# Patient Record
(demographics unavailable — no encounter records)

---

## 2025-01-13 NOTE — REVIEW OF SYSTEMS
[Short Stature] : short stature was noted [Change in Activity] : no change in activity [Rash] : no rash [Skin Lesions] : no skin lesions [Back Pain] : ~T no back pain [Chest Pain] : no chest pain [Shortness of Breath] : no shortness of breath [Change in Appetite] : no change in appetite [Abdominal Pain] : no abdominal pain [Constipation] : no constipation [Sleep Disturbances] : ~T no sleep disturbances [Headache] : no headache

## 2025-01-13 NOTE — HISTORY OF PRESENT ILLNESS
[FreeTextEntry2] : Gavino is a 15 year 2-month-old male here for follow up for growth hormone deficiency on Skytrofa started on 3/1/23.  His current dose is 13.3 mg weekly. Patient takes growth hormone injections on Monday night. Missed one dose in the past 6 month, overall consistent with injections.  Denied headaches, vision changes, leg/hip pains, peripheral swellings.  Reports knee pain vs numbness, attributes to exercise. Gavino exercises in gym, does weights.  He cut out soda but eats a lot of fast food.  No recent illnesses.

## 2025-01-13 NOTE — PHYSICAL EXAM
[Healthy Appearing] : healthy appearing [Normal Appearance] : normal appearance [Well formed] : well formed [Normally Set] : normally set [WNL for age] : within normal limits of age [None] : there were no thyroid nodules [Normal S1 and S2] : normal S1 and S2 [Clear to Ausculation Bilaterally] : clear to auscultation bilaterally [Abdomen Soft] : soft [Abdomen Tenderness] : non-tender [] : no hepatosplenomegaly [Testes] : normal [___] : [unfilled] [Normal] : normal  [Goiter] : no goiter [Murmur] : no murmurs [5] : was [place] stage 5 [Abundant] : abundant

## 2025-01-13 NOTE — ASSESSMENT
[FreeTextEntry1] : 15 year 2-month-old male with growth hormone deficiency, on long-acting growth hormone Skytrofa started 3/1/23. Patient grew ~3cm/6 month (~6 cm/year).   Will f/u pending labs Will consider adjusting growth hormone dose after reviewing blood work results.  Bone age X-ray as prescribed

## 2025-01-13 NOTE — DATA REVIEWED
[FreeTextEntry1] : 1/11/25 HbA1C 5.4%  7/10/24 CBC WNL, free T4  1.3, TSH  1.27, HbA1C 5.7%,  IGF-1  908, IGF- BP3 6.58  I personally reviewed bone age X-ray performed on 3/11/24 at a chronological age 14 years 3 month and felt that it is most consistent with Greulich and Juan Diego standard 14 years. Francisco Pinneau predicted height is ~70 inches.  2/26/24 CBC/CMP WNL, free T4  1.1, TSH not done,  IGF-1  487, IGF-BP3  7.1.   8/14/23 (day #4.5 post Skytrofa injection) CBC/CMP WNL, HbA1C 5.1%, free T4  1.0, TSH  0.31, IGF-1  744(+3.3SD), IGF-BP3  9.9(H).   Pituitary MRI (1/19/23) Equivocal 0.4 cm focal hyperenhancement in the posterior aspect of the pituitary gland, questionable for microadenoma.   I personally reviewed bone age X-ray performed on 11/26/22 at a chronological age 13 years and felt that it is most consistent with Greulich and Juan Diego standard 13 years. Francisco Pinneau predicted height is 66 inches.   10/26/22 CBC/CMP WNL, free T4  1.3, TSH 1.44, IGA  104, TTgIGA<1.2, ESR 1, IGF-1  286, IGF-BP3  4.74.

## 2025-02-03 NOTE — HISTORY OF PRESENT ILLNESS
[FreeTextEntry1] : Pt has a history of executive functioning and learning challenges. He was referred for a neuropsychological evaluation to determine his overall functioning and provide treatment recommendations.

## 2025-02-03 NOTE — REASON FOR VISIT
[Psychology testing session] : Psychology testing session [Patient with collateral] : Patient with collateral  [Father] : father [FreeTextEntry1] : neuropsychological evaluation

## 2025-02-03 NOTE — DISCUSSION/SUMMARY
[FreeTextEntry8] : Pts parent was seen in person for intake for 20 minutes (1:30-1:50pm). Pts parent discussed reason for referral and concerns with Pts functioning, including executive functioning challenges. Limits of confidentiality were discussed.  Pt was accompanied by father and seen for first testing session for 1 hour and 45 minutes (1:50-3:35pm), followed by 20 minutes of scoring. The WISC-V and WIAT 4 were administered. Breaks were taken as needed. Pt. was alert throughout testing. Full report to follow.  Next session scheduled for 02/04/2025. Parent will be contacted to schedule a feedback session upon completion of the report.

## 2025-02-05 NOTE — DISCUSSION/SUMMARY
[FreeTextEntry8] : Pt was seen for second testing session for 2 hours and 16 minutes (1:39-3:55pm), followed by 31 minutes of scoring. The WIAT-4 was administered. Breaks were taken as needed. Patient was alert throughout testing. BRIEF-2 and Dowell rating scales were also distributed to parents. Full report to follow.  Note: Extended testing time for this evaluation was medically necessary due to pt's challenges with executive functioning. He required additional time to process and express information, thereby extending testing that is medically necessary for diagnostic clarity and treatment planning.    [FreeTextEntry4] : Parent will be contacted to schedule a feedback session upon completion of the report.

## 2025-02-05 NOTE — REASON FOR VISIT
[Psychology testing session] : Psychology testing session [Patient] : Patient [FreeTextEntry1] : neuropsychological evaluation

## 2025-03-05 NOTE — DISCUSSION/SUMMARY
[FreeTextEntry8] : Pt's parents were seen for feedback for 45 minutes via telehealth (9:30am-10:15am) (via audio/video teleconferencing). Parent and provider attended session within [facility].  Results and recommendations were reviewed and they were in agreement. They will be provided with a signed copy of the evaluation.   Note: Extended testing time for this evaluation was medically necessary due to pt's challenges with executive functioning. He required additional time to process and express information, thereby extending testing that is medically necessary for diagnostic clarity and treatment planning.   Evaluation is complete; see full report for detailed information about results, conclusions, and recommendations.

## 2025-03-05 NOTE — REASON FOR VISIT
[Feedback of results of psychological evaluation] : Feedback of results of psychological evaluation [Collateral without patient] : Collateral without patient [Mother] : mother [Father] : father [FreeTextEntry1] : neuropsychological evaluation

## 2025-06-27 NOTE — ASSESSMENT
[FreeTextEntry1] : 15 year 7-month-old male with growth hormone deficiency, on long-acting growth hormone Skytrofa started 3/1/23. Patient grew ~3cm/6 month (~6 cm/year). He has mild left thoracolumbar scoliosis.   Repeat lab work as prescribed.  Will consider adjusting Skytrofa dose after reviewing lab work results.  Bone age X-ray to be done prior to next visit.  Recommended f/u with Orthopedist.

## 2025-06-27 NOTE — HISTORY OF PRESENT ILLNESS
[FreeTextEntry2] : Gavino is a 15 year 7-month-old male here for follow up for growth hormone deficiency on Skytrofa started on 3/1/23.  His current dose is 15.2mg weekly. It was increased from 13.3 mg weekly in March.   Injections given by father on Sundays or Monday night. Last injections given on Monday.   Patient denied headaches, vision changes, leg/hip pains, peripheral swellings.   Gavino is going to a hockey sleep away camp in [place].   No recent illnesses.

## 2025-06-27 NOTE — PHYSICAL EXAM
[Healthy Appearing] : healthy appearing [Normal Appearance] : normal appearance [Well formed] : well formed [Normally Set] : normally set [WNL for age] : within normal limits of age [None] : there were no thyroid nodules [Normal S1 and S2] : normal S1 and S2 [Clear to Ausculation Bilaterally] : clear to auscultation bilaterally [Abdomen Soft] : soft [Abdomen Tenderness] : non-tender [] : no hepatosplenomegaly [5] : was [place] stage 5 [Abundant] : abundant [Testes] : normal [___] : [unfilled] [Normal] : normal  [Left Paraspinal Hump] : left paraspinal hump was appreciated [Goiter] : no goiter [Murmur] : no murmurs

## 2025-06-28 NOTE — PHYSICAL EXAM
[Alert] : alert [No Acute Distress] : no acute distress [Normocephalic] : normocephalic [EOMI Bilateral] : EOMI bilateral [Clear tympanic membranes with bony landmarks and light reflex present bilaterally] : clear tympanic membranes with bony landmarks and light reflex present bilaterally  [Pink Nasal Mucosa] : pink nasal mucosa [Nonerythematous Oropharynx] : nonerythematous oropharynx [Supple, full passive range of motion] : supple, full passive range of motion [No Palpable Masses] : no palpable masses [Clear to Auscultation Bilaterally] : clear to auscultation bilaterally [Regular Rate and Rhythm] : regular rate and rhythm [Normal S1, S2 audible] : normal S1, S2 audible [No Murmurs] : no murmurs [+2 Femoral Pulses] : +2 femoral pulses [Soft] : soft [NonTender] : non tender [Non Distended] : non distended [Normoactive Bowel Sounds] : normoactive bowel sounds [No Hepatomegaly] : no hepatomegaly [No Splenomegaly] : no splenomegaly [Mil[place]: _____] : [place] [unfilled] [Circumcised] : circumcised [Bilateral descended testes] : bilateral descended testes [No Abnormal Lymph Nodes Palpated] : no abnormal lymph nodes palpated [Normal Muscle Tone] : normal muscle tone [No Gait Asymmetry] : no gait asymmetry [No pain or deformities with palpation of bone, muscles, joints] : no pain or deformities with palpation of bone, muscles, joints [Straight] : straight [+2 Patella DTR] : +2 patella DTR [Cranial Nerves Grossly Intact] : cranial nerves grossly intact [No Rash or Lesions] : no rash or lesions [de-identified] : improved scoliosis - noted 5 degrees on scoliometer

## 2025-06-28 NOTE — HISTORY OF PRESENT ILLNESS
[Mother] : mother [Yes] : Patient goes to dentist yearly [Toothpaste] : Primary Fluoride Source: Toothpaste [Up to date] : Up to date [Eats meals with family] : eats meals with family [Has family members/adults to turn to for help] : has family members/adults to turn to for help [Is permitted and is able to make independent decisions] : Is permitted and is able to make independent decisions [Eats regular meals including adequate fruits and vegetables] : eats regular meals including adequate fruits and vegetables [Has friends] : has friends [Has interests/participates in community activities/volunteers] : has interests/participates in community activities/volunteers. [Uses safety belts/safety equipment] : uses safety belts/safety equipment  [Has peer relationships free of violence] : has peer relationships free of violence [No] : Patient has not had sexual intercourse [With Teen] : teen [NO] : No [Grade: ____] : Grade: [unfilled] [Sleep Concerns] : no sleep concerns [At least 1 hour of physical activity a day] : does not do at least 1 hour of physical activity a day [Screen time (except homework) less than 2 hours a day] : no screen time (except homework) less than 2 hours a day [Impaired/distracted driving] : no impaired/distracted driving [FreeTextEntry7] : Doing well - no major hospitalizations or ED visits  - follows with endo for hypopit, still on weekly GH shots - ADHD - no meds, no services, needs dx letter [de-identified] : aspires to be an ice  [de-identified] : safety discussed, see shyanne  [de-identified] : safety discussed, see phq-a

## 2025-06-28 NOTE — HISTORY OF PRESENT ILLNESS
[Mother] : mother [Yes] : Patient goes to dentist yearly [Toothpaste] : Primary Fluoride Source: Toothpaste [Up to date] : Up to date [Eats meals with family] : eats meals with family [Has family members/adults to turn to for help] : has family members/adults to turn to for help [Is permitted and is able to make independent decisions] : Is permitted and is able to make independent decisions [Eats regular meals including adequate fruits and vegetables] : eats regular meals including adequate fruits and vegetables [Has friends] : has friends [Has interests/participates in community activities/volunteers] : has interests/participates in community activities/volunteers. [Uses safety belts/safety equipment] : uses safety belts/safety equipment  [Has peer relationships free of violence] : has peer relationships free of violence [No] : Patient has not had sexual intercourse [With Teen] : teen [NO] : No [Grade: ____] : Grade: [unfilled] [Sleep Concerns] : no sleep concerns [At least 1 hour of physical activity a day] : does not do at least 1 hour of physical activity a day [Screen time (except homework) less than 2 hours a day] : no screen time (except homework) less than 2 hours a day [Impaired/distracted driving] : no impaired/distracted driving [FreeTextEntry7] : Doing well - no major hospitalizations or ED visits  - follows with endo for hypopit, still on weekly GH shots - ADHD - no meds, no services, needs dx letter [de-identified] : aspires to be an ice  [de-identified] : safety discussed, see shyanne  [de-identified] : safety discussed, see phq-a

## 2025-06-28 NOTE — DISCUSSION/SUMMARY
[] : The components of the vaccine(s) to be administered today are listed in the plan of care. The disease(s) for which the vaccine(s) are intended to prevent and the risks have been discussed with the caretaker.  The risks are also included in the appropriate vaccination information statements which have been provided to the patient's caregiver.  The caregiver has given consent to vaccinate. [FreeTextEntry1] : AIDEN is a 15 year M presenting for [facility]. HEADSSS completed, with teen privately.   C - Growth and development reviewed - Anticipatory guidance and routine care provided - RTC for next WCC and prn - Screening: Vision, Color Test, Hearing - Labs: labwork holiday, f/u with endo if labs needed - Immunizations: HPV #2 - Referrals: none  Teen AG: Continue balanced diet with all food groups. Brush teeth twice a day with toothbrush. Recommend visit to dentist. Maintain consistent daily routines and sleep schedule. Personal hygiene, puberty, and sexual health reviewed. Risky behaviors assessed. School discussed. Limit screen time to no more than 2 hours per day. Encourage physical activity.   Caretaker expressed understanding of the plan and agrees. No other concerns or questions today.

## 2025-06-28 NOTE — PHYSICAL EXAM
[Alert] : alert [No Acute Distress] : no acute distress [Normocephalic] : normocephalic [EOMI Bilateral] : EOMI bilateral [Clear tympanic membranes with bony landmarks and light reflex present bilaterally] : clear tympanic membranes with bony landmarks and light reflex present bilaterally  [Pink Nasal Mucosa] : pink nasal mucosa [Nonerythematous Oropharynx] : nonerythematous oropharynx [Supple, full passive range of motion] : supple, full passive range of motion [No Palpable Masses] : no palpable masses [Clear to Auscultation Bilaterally] : clear to auscultation bilaterally [Regular Rate and Rhythm] : regular rate and rhythm [Normal S1, S2 audible] : normal S1, S2 audible [No Murmurs] : no murmurs [+2 Femoral Pulses] : +2 femoral pulses [Soft] : soft [NonTender] : non tender [Non Distended] : non distended [Normoactive Bowel Sounds] : normoactive bowel sounds [No Hepatomegaly] : no hepatomegaly [No Splenomegaly] : no splenomegaly [Mil[place]: _____] : [place] [unfilled] [Circumcised] : circumcised [Bilateral descended testes] : bilateral descended testes [No Abnormal Lymph Nodes Palpated] : no abnormal lymph nodes palpated [Normal Muscle Tone] : normal muscle tone [No Gait Asymmetry] : no gait asymmetry [No pain or deformities with palpation of bone, muscles, joints] : no pain or deformities with palpation of bone, muscles, joints [Straight] : straight [+2 Patella DTR] : +2 patella DTR [Cranial Nerves Grossly Intact] : cranial nerves grossly intact [No Rash or Lesions] : no rash or lesions [de-identified] : improved scoliosis - noted 5 degrees on scoliometer

## 2025-06-28 NOTE — DISCUSSION/SUMMARY
[] : The components of the vaccine(s) to be administered today are listed in the plan of care. The disease(s) for which the vaccine(s) are intended to prevent and the risks have been discussed with the caretaker.  The risks are also included in the appropriate vaccination information statements which have been provided to the patient's caregiver.  The caregiver has given consent to vaccinate. [FreeTextEntry1] : AIDEN is a 15 year M presenting for [facility]. HEADSSS completed, with teen privately.   C - Growth and development reviewed - Anticipatory guidance and routine care provided - RTC for next WCC and prn - Screening: Vision, Color Test, Hearing - Labs: labwork holiday, f/u with endo if labs needed - Immunizations: HPV #2 - Referrals: none  Teen AG: Continue balanced diet with all food groups. Brush teeth twice a day with toothbrush. Recommend visit to dentist. Maintain consistent daily routines and sleep schedule. Personal hygiene, puberty, and sexual health reviewed. Risky behaviors assessed. School discussed. Limit screen time to no more than 2 hours per day. Encourage physical activity.   Caretaker expressed understanding of the plan and agrees. No other concerns or questions today.